# Patient Record
Sex: FEMALE | Race: WHITE
[De-identification: names, ages, dates, MRNs, and addresses within clinical notes are randomized per-mention and may not be internally consistent; named-entity substitution may affect disease eponyms.]

---

## 2017-01-02 ENCOUNTER — HOSPITAL ENCOUNTER (EMERGENCY)
Dept: HOSPITAL 74 - JER | Age: 57
LOS: 1 days | Discharge: HOME | End: 2017-01-03
Payer: COMMERCIAL

## 2017-01-02 VITALS — TEMPERATURE: 98.5 F

## 2017-01-02 VITALS — BODY MASS INDEX: 24.4 KG/M2

## 2017-01-02 DIAGNOSIS — G51.0: Primary | ICD-10-CM

## 2017-01-02 LAB
ALBUMIN SERPL-MCNC: 4.3 G/DL (ref 3.4–5)
ALP SERPL-CCNC: 101 U/L (ref 45–117)
ALT SERPL-CCNC: 32 U/L (ref 12–78)
ANION GAP SERPL CALC-SCNC: 5 MMOL/L (ref 8–16)
APPEARANCE UR: CLEAR
AST SERPL-CCNC: 19 U/L (ref 15–37)
BACTERIA #/AREA URNS HPF: (no result) /HPF
BASOPHILS # BLD: 1 % (ref 0–2)
BILIRUB SERPL-MCNC: 0.3 MG/DL (ref 0.2–1)
BILIRUB UR STRIP.AUTO-MCNC: NEGATIVE MG/DL
CALCIUM SERPL-MCNC: 9.2 MG/DL (ref 8.5–10.1)
CHOLEST SERPL-MCNC: 267 MG/DL (ref 50–200)
CO2 SERPL-SCNC: 30 MMOL/L (ref 21–32)
COLOR UR: COLORLESS
CREAT SERPL-MCNC: 0.6 MG/DL (ref 0.55–1.02)
DEPRECATED RDW RBC AUTO: 12.5 % (ref 11.6–15.6)
EOSINOPHIL # BLD: 1.9 % (ref 0–4.5)
GLUCOSE SERPL-MCNC: 105 MG/DL (ref 74–106)
INR BLD: 0.95 (ref 0.82–1.09)
KETONES UR QL STRIP: NEGATIVE
LDLC SERPL CALC-MCNC: 176 MG/DL (ref 5–100)
LEUKOCYTE ESTERASE UR QL STRIP.AUTO: (no result)
MCH RBC QN AUTO: 31.4 PG (ref 25.7–33.7)
MCHC RBC AUTO-ENTMCNC: 33.7 G/DL (ref 32–36)
MCV RBC: 93.1 FL (ref 80–96)
NEUTROPHILS # BLD: 61.7 % (ref 42.8–82.8)
NITRITE UR QL STRIP: NEGATIVE
PH UR: 6 [PH] (ref 5–8)
PLATELET # BLD AUTO: 330 K/MM3 (ref 134–434)
PMV BLD: 7.2 FL (ref 7.5–11.1)
PROT SERPL-MCNC: 7.6 G/DL (ref 6.4–8.2)
PROT UR QL STRIP: NEGATIVE
PROT UR QL STRIP: NEGATIVE
PT PNL PPP: 10.4 SEC (ref 9.98–11.88)
RBC # BLD AUTO: <1 /HPF (ref 0–3)
RBC # UR STRIP: NEGATIVE /UL
SP GR UR: 1 (ref 1–1.03)
TROPONIN I SERPL-MCNC: < 0.02 NG/ML (ref 0–0.05)
UROBILINOGEN UR STRIP-MCNC: NEGATIVE E.U./DL (ref 0.2–1)
WBC # BLD AUTO: 5.9 K/MM3 (ref 4–10)
WBC # UR AUTO: 1 /HPF (ref 3–5)

## 2017-01-02 PROCEDURE — 3E0337Z INTRODUCTION OF ELECTROLYTIC AND WATER BALANCE SUBSTANCE INTO PERIPHERAL VEIN, PERCUTANEOUS APPROACH: ICD-10-PCS

## 2017-01-02 PROCEDURE — 3E033NZ INTRODUCTION OF ANALGESICS, HYPNOTICS, SEDATIVES INTO PERIPHERAL VEIN, PERCUTANEOUS APPROACH: ICD-10-PCS

## 2017-01-02 NOTE — PDOC
History of Present Illness





- History of Present Illness


Initial Comments: 


01/02/17 23:12


Patient is a 56 year old female with significant medical hx of nerve palsy, 

arthritis, and hyperlipidemia who is presenting to the ED with one week of 

blurry vision and one day of slurred speech. The patient reports she had blurry 

vision for over a week but she figured it was due to her history of nerve 

palsy. Today she woke up with difficulty speaking that was waxing and waning. 

She also complains of dizziness and nausea. 








<Kianna Tobin - Last Filed: 01/02/17 23:12>





<Neli Salvador - Last Filed: 01/02/17 23:50>





- General


Chief Complaint: CVA/TIA


Stated Complaint: BLURRY VISION/DIFF BREATHING


Time Seen by Provider: 01/02/17 19:16





Past History





<Kianna Tobin - Last Filed: 01/02/17 23:12>





- Past Medical History


Anemia: No


Asthma: No


Cancer: No


Cardiac Disorders: No


CVA: No


COPD: No


CHF: No


Dementia: No


Diabetes: No


GI Disorders: Yes (H/O COLON POLYPS)


 Disorders: No


HTN: No


Hypercholesterolemia: Yes (NO MEDICATIONS)


Liver Disease: No


Seizures: No


Thyroid Disease: No


Other medical history: ARTHRITIS





- Surgical History


Abdominal Surgery: No


Appendectomy: Yes


Cardiac Surgery: No


Cholecystectomy: No


Lung Surgery: No


Neurologic Surgery: No


Orthopedic Surgery: No





- Psycho/Social/Smoking Cessation Hx


Suicidal Ideation: No


Smoking History: Never smoked


Have you smoked in the past 12 months: No


If you are a former smoker, when did you quit?: 2010


Information on smoking cessation initiated: No


Hx Alcohol Use: Yes (SOCIALLY)


Drug/Substance Use Hx: No


Substance Use Type: Alcohol


Hx Substance Use Treatment: No





<Neli Salvador - Last Filed: 01/02/17 23:50>





- Past Medical History


Allergies/Adverse Reactions: 


 Allergies











Allergy/AdvReac Type Severity Reaction Status Date / Time


 


No Known Allergies Allergy   Verified 01/02/17 18:58











Home Medications: 


Ambulatory Orders





NK [No Known Home Medication]  01/02/17 











**Review of Systems





- Review of Systems


Comments:: 


01/02/17 23:13


CONSTITUTIONAL:


Absent: fever, chills, diaphoresis, generalized weakness, malaise, loss of 

appetite


HEENT:


Present: diplopia 


Absent: rhinorrhea, nasal congestion, throat pain, throat swelling, difficulty 

swallowing, mouth swelling, ear pain, eye pain


CARDIOVASCULAR: 


Absent: chest pain, syncope, palpitations, irregular heart rate, lightheadedness

, peripheral edema


RESPIRATORY: 


Absent: cough, shortness of breath, dyspnea with exertion, orthopnea, wheezing, 

stridor, hemoptysis


GASTROINTESTINAL:


Present: nausea


Absent: abdominal pain, abdominal distension, vomiting, diarrhea, constipation, 

melena, hematochezia


GENITOURINARY: 


Absent: dysuria, frequency, urgency, hesitancy, hematuria, flank pain, genital 

pain


MUSCULOSKELETAL: 


Absent: myalgia, arthralgia, joint swelling


SKIN: 


Absent: rash, itching, pallor


HEMATOLOGIC/IMMUNOLOGIC: 


Absent: easy bleeding, easy bruising, lymphadenopathy, frequent infections


ENDOCRINE:


Absent: unexplained weight gain, unexplained weight loss, heat intolerance, 

cold intolerance


NEUROLOGIC: 


Present: slurred speech, dizziness


Absent: headache, focal weakness or paresthesia, unsteady gait, seizure, mental 

status changes, bladder or bowel incontinence.


PSYCHIATRIC: 


Absent: anxiety, depression, suicidal or homicidal ideation, hallucinations








<Kianna Tobin - Last Filed: 01/02/17 23:12>





*Physical Exam





- Vital Signs


 Last Vital Signs











Temp Pulse Resp BP Pulse Ox


 


 98.5 F   88   18   182/95   100 


 


 01/02/17 18:53  01/02/17 18:53  01/02/17 18:53  01/02/17 18:53  01/02/17 19:20














- Physical Exam


Comments: 


01/02/17 23:15


GENERAL:


Well developed, well nourished. Awake and alert. No acute distress.


HEENT:


Normocephalic, atraumatic. PERRLA. Extraoccular eye movements show superior 

oblique palsy. No conjunctival pallor. Sclera are non-icteric. Moist mucous 

membranes. Oropharynx is clear.


NECK: 


Supple. Full ROM. No JVD. Carotid pulses 2+ and symmetric, without bruits. No 

thyromegaly. No 


lymphadenopathy.


CARDIOVASCULAR:


Regular rate and rhythm. No murmurs, rubs, or gallops. Distal pulses are 2+ and 

symmetric. 


PULMONARY: 


No evidence of respiratory distress. Lungs clear to auscultation bilaterally. 

No wheezing, rales or rhonchi.


ABDOMINAL:


Soft. Non-tender. Non-distended. No rebound or guarding. No organomegaly. 

Normoactive bowel sounds. 


MUSCULOSKELETAL: 


Normal range of motion at all joints. No bony deformities or tenderness. No CVA 

tenderness.


EXTREMITIES: 


No cyanosis. No clubbing. No edema. No calf tenderness.


SKIN: 


Warm and dry. Normal capillary refill. No rashes. No jaundice. 


NEUROLOGICAL: 


Alert, awake, appropriate. Cranial nerves 2-12 intact. No gross focal neural 

deficits. Mild facial numbness on the left. Normal speech. Gait is normal 

without ataxia.


PSYCHIATRIC: 


Cooperative. Good eye contact. Appropriate mood and affect.








<Kianna Tobin - Last Filed: 01/02/17 23:12>





- Vital Signs


 Last Vital Signs











Temp Pulse Resp BP Pulse Ox


 


 98.5 F   88   18   182/95   98 


 


 01/02/17 18:53  01/02/17 18:53  01/02/17 18:53  01/02/17 18:53  01/02/17 18:53














<Neli Salvador - Last Filed: 01/02/17 23:50>





**Discharge Disposition





<Kianna Tobin - Last Filed: 01/02/17 23:12>





<Neli Salvador - Last Filed: 01/02/17 23:50>





- Diagnosis


 Gaze palsy, Bell's palsy





- Discharge Dispostion


Disposition: HOME


Condition at time of disposition: Stable





- Referrals


Referrals: 


Jane Alonzo MD [Primary Care Provider] - 





- Patient Instructions


Printed Discharge Instructions:  DI for Bell's Palsy, DI for Visual Field 

Disturbances


Additional Instructions: 


Tomorrow see Dr Jane Alonzo 


-she is going to make arrangement for further neurological testing and also you 

need to see your ophthalmologist again for your gaze palsy





ED Treatment Course





- LABORATORY


CBC & Chemistry Diagram: 


 01/02/17 19:30





 01/02/17 19:30





- ADDITIONAL ORDERS


Additional order review: 


 Laboratory  Results











  01/02/17 01/02/17





  19:30 19:30


 


INR   0.95


 


Urine Color  Colorless 


 


Urine Appearance  Clear 


 


Urine pH  6.0 


 


Ur Specific Gravity  1.002 


 


Urine Protein  Negative 


 


Urine Glucose (UA)  Negative 


 


Urine Ketones  Negative 


 


Urine Blood  Negative 


 


Urine Nitrite  Negative 


 


Urine Bilirubin  Negative 


 


Urine Urobilinogen  Negative 


 


Ur Leukocyte Esterase  Trace H 


 


Urine RBC  <1 


 


Urine WBC  1 


 


Ur Epithelial Cells  Rare 


 


Urine Bacteria  Few 








 











  01/02/17





  19:30


 


RBC  4.63


 


MCV  93.1


 


MCHC  33.7


 


RDW  12.5


 


MPV  7.2 L


 


Neutrophils %  61.7


 


Lymphocytes %  28.2


 


Monocytes %  7.2


 


Eosinophils %  1.9


 


Basophils %  1.0














- RADIOLOGY


Radiograph Interpretation: 





01/02/17 20:15











: ([email or web address])


================= Begin of Report Content =================





Referring Physician: Neli Salvador


Alia Marroquin


Spoke with Dr. Salvador at 1946 hrs


THIS DOCUMENT HAS BEEN ELECTRONICALLY SIGNED


Kevin Cason MD


01/02/2017 19:47 EST


M.D. Please call [facility] [phone] ([phone]) with questions.








PREVIOUS REPORT:


Referring Physician: Neli Salvador


Patient Name: Alia Marroquin


THIS IS A PRELIMINARY REPORT FROM [facility]





DATE OF SERVICE: 2015-05-21 13:36:06.0





IMAGES: [id number]





EXAM: CT of the brain without contrast





HISTORY:Possible cerebrovascular accident





COMPARISON: None.





FINDINGS:Serial transaxial images of the brain are available without 

intravenous 


contrast agent.





The brain parenchyma is normal. The ventricular system is within normal limits.





Mastoid air cells are well-aerated. The calvarium appears intact. Visible 


portions of the paranasal sinuses are well-aerated.





IMPRESSION: Negative study


THIS DOCUMENT HAS BEEN ELECTRONICALLY SIGNED


Kevin Cason MD


01/02/2017 19:40 EST


M.D. Please call [facility] [phone]) with questions.





================= End of Report Content =================


 














- Medications


Given in the ED: 


ED Medications














Discontinued Medications














Generic Name Dose Route Start Last Admin





  Trade Name Freq  PRN Reason Stop Dose Admin


 


Acetaminophen  1,000 mg 01/02/17 19:50 01/02/17 19:54





  Ofirmev Injection -  IVPB 01/02/17 19:51  1,000 mg





  ONCE ONE   Administration














<Kianna Tobin - Last Filed: 01/02/17 23:12>





- LABORATORY


CBC & Chemistry Diagram: 


 01/02/17 19:30





 01/02/17 19:30





<Neli Salvador - Last Filed: 01/02/17 23:50>





Attestations





- Attestations


01/02/17 23:18


Documentation prepared by Kianna Tobin, acting as medical scribe for Neli Salvador MD.





<Kianna Tobin - Last Filed: 01/02/17 23:12>

## 2017-01-03 VITALS — HEART RATE: 77 BPM | SYSTOLIC BLOOD PRESSURE: 156 MMHG | DIASTOLIC BLOOD PRESSURE: 87 MMHG

## 2017-01-03 NOTE — EKG
Test Reason : 

Blood Pressure : ***/*** mmHG

Vent. Rate : 072 BPM     Atrial Rate : 072 BPM

   P-R Int : 140 ms          QRS Dur : 082 ms

    QT Int : 390 ms       P-R-T Axes : 033 013 032 degrees

   QTc Int : 427 ms

 

NORMAL SINUS RHYTHM

NORMAL ECG

NO PREVIOUS ECGS AVAILABLE

Confirmed by NILE MORENO MD ([id number]) on 1/3/2017 9:52:08 AM

 

Referred By:             Overread By: NILE MORENO MD

## 2019-11-08 ENCOUNTER — HOSPITAL ENCOUNTER (OUTPATIENT)
Dept: HOSPITAL 74 - FASU | Age: 59
Discharge: HOME | End: 2019-11-08
Attending: ORTHOPAEDIC SURGERY
Payer: COMMERCIAL

## 2019-11-08 VITALS — DIASTOLIC BLOOD PRESSURE: 76 MMHG | TEMPERATURE: 98 F | HEART RATE: 75 BPM | SYSTOLIC BLOOD PRESSURE: 132 MMHG

## 2019-11-08 VITALS — BODY MASS INDEX: 27.1 KG/M2

## 2019-11-08 DIAGNOSIS — S83.242A: Primary | ICD-10-CM

## 2019-11-08 DIAGNOSIS — S83.282A: ICD-10-CM
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
## 2019-11-08 DIAGNOSIS — M65.862: ICD-10-CM

## 2019-11-08 DIAGNOSIS — S83.8X2A: ICD-10-CM

## 2019-11-08 PROCEDURE — 0SBD4ZZ EXCISION OF LEFT KNEE JOINT, PERCUTANEOUS ENDOSCOPIC APPROACH: ICD-10-PCS | Performed by: ORTHOPAEDIC SURGERY

## 2019-11-08 NOTE — OP
DATE OF OPERATION:  11/08/2019

 

Done at [facility] 

 

SURGEON:  Ish Ballesteros MD 

 

ASSISTANT:  DIANA Borjas 

 

PREOPERATIVE DIAGNOSES:

1.  Left knee medial and lateral meniscal tear.  

2.  Left knee cartilage injury.

3.  Left knee synovitis.  

 

POSTOPERATIVE DIAGNOSES: 

1.  Left knee medial and lateral meniscal tear.  

2.  Left knee cartilage injury.

3.  Left knee synovitis.  

 

PROCEDURE:

1.  Left knee arthroscopy with partial meniscectomy, medial and lateral meniscus, CPT

code 03992.

2.  Left knee arthroscopy with chondroplasty and abrasion-plasty, CPT code 85095.

3.  Left knee arthroscopy with synovectomy, CPT code 34862. 

 

FINDINGS:

1.  Medial meniscus body-to-posterior horn tear.

2.  Lateral meniscus central body-to-posterior horn tear. 

3.  Synovitis patellofemoral medial and lateral notch area.

4.  Central grade 3-4 cartilage injury central medial femoral condyle with cartilage

flap 6 cm x 4 cm. 

5.  ACL and PCL intact.

6.  Diffuse grade 1-2 cartilage injury lateral joint line.

7.  Central grade 2-3 cartilage injury patella with 3-4 changes patellofemoral

trochlea central 1/3. 

 

PROCEDURE:  Informed consent was obtained.  The patient came to the operating room,

where the lower extremity was prepped and draped in a sterile fashion.  A tourniquet

was placed on the upper thigh, but not inflated. 

 

Using standard arthroscopic technique, a lateral incision and portal was made to

allow for introduction of the camera into the suprapatellar bursa.  This was then

taken to the medial joint line, where under direct visualization, a medial incision

and portal was made.

 

Excessive synovium noted in the medial, lateral and patellofemoral and notch area was

removed by an upbiter, shaver and Bovie cautery.  This was found to bring in

inflammatory tissue into the joint surface, a source of pain and dysfunction. 

 

Probing of the medial and lateral meniscus found tears, as described in the findings.

 These were removed with the upbiter and shaver and taken back to a stable rim. 

 

Grade 2 to 3 degenerative changes were treated with a chondroplasty, removing all

flaking surfaces with low-setting Bovie along the periphery to prevent further

flaking.  

 

Grade 4 changes, as noted, were treated with an abrasoplasty, creating a bleeding

surface at the bone/cartilage interface.  Aggressive debridement with shaver/luis

created bleeding surface.  Micro fracture also done when indicated in findings.

 

All areas of the knee were once again reexamined.  The knee was then drained and a

single suture was placed in all portals.  A sterile dressing was placed and the

patient was transferred to the recovery room without complication. 

 

The PA listed above was present and assisted at surgery.  Their presence was

absolutely medically necessary for the completion of the procedure.  They helped hold

the arthroscopy, pass instruments (and implants when indicated) and the procedure

could not have been completed without their assistance.

 

 

ISH BALLESTEROS M.D.

 

[id number]

DD: 11/08/2019 11:40

DT: 11/08/2019 13:51

Job #:  [id number]

## 2019-11-13 NOTE — PATH
Surgical Pathology Report



Patient Name:  CHUCKY BAUTISTA

Accession #:  [id number]

Med. Rec. #:  [id number]                                                        

   /Age/Gender:  1960 (Age: 59) / F

Account:  [id number]                                                          

             Location: [facility] AMBULATORY 

Taken:  2019

Received:  2019

Reported:  2019

Physicians:  Ish Carson M.D.

  



Specimen(s) Received

 LEFT KNEE SHAVINGS 





Clinical History

Internal derangement of the left knee







Final Diagnosis

Knee, left, arthroscopic shavings:

Fibrosynovial tissue and cartilage.





***Electronically Signed***

May Santana M.D.





Gross Description

Received in formalin, labeled "left knee shavings," is a 3.5 x 3.0 x 0.3 cm.

aggregate of tan-yellow soft tissue fragments. A representative portion is

submitted in one cassette.

/2019



[place]

## 2024-01-15 ENCOUNTER — OFFICE (OUTPATIENT)
Dept: URBAN - METROPOLITAN AREA CLINIC 121 | Facility: CLINIC | Age: 64
Setting detail: OPHTHALMOLOGY
End: 2024-01-15
Payer: COMMERCIAL

## 2024-01-15 DIAGNOSIS — G70.00: ICD-10-CM

## 2024-01-15 DIAGNOSIS — H25.13: ICD-10-CM

## 2024-01-15 DIAGNOSIS — H16.223: ICD-10-CM

## 2024-01-15 DIAGNOSIS — H40.023: ICD-10-CM

## 2024-01-15 PROCEDURE — 76514 ECHO EXAM OF EYE THICKNESS: CPT | Performed by: OPHTHALMOLOGY

## 2024-01-15 PROCEDURE — 92250 FUNDUS PHOTOGRAPHY W/I&R: CPT | Performed by: OPHTHALMOLOGY

## 2024-01-15 PROCEDURE — 92004 COMPRE OPH EXAM NEW PT 1/>: CPT | Performed by: OPHTHALMOLOGY

## 2024-01-15 ASSESSMENT — SUPERFICIAL PUNCTATE KERATITIS (SPK)
OD_SPK: T
OS_SPK: T

## 2024-01-15 ASSESSMENT — CONFRONTATIONAL VISUAL FIELD TEST (CVF)
OD_FINDINGS: FULL
OS_FINDINGS: FULL

## 2024-01-16 PROBLEM — G70.00 MYASTHENIA GRAVIS WITHOUT ACUTE EXACERBATION ; BOTH EYES: Status: ACTIVE | Noted: 2024-01-15

## 2024-01-16 ASSESSMENT — REFRACTION_AUTOREFRACTION
OS_AXIS: 180
OS_SPHERE: +2.75
OS_CYLINDER: +0.75
OD_AXIS: 168
OD_SPHERE: +3.00
OD_CYLINDER: +0.50

## 2024-01-16 ASSESSMENT — REFRACTION_CURRENTRX
OS_CYLINDER: +1.00
OS_AXIS: 001
OD_AXIS: 163
OD_ADD: +2.25
OS_OVR_VA: 20/
OD_SPHERE: +2.75
OD_OVR_VA: 20/
OS_SPHERE: +2.75
OD_CYLINDER: +0.75
OS_ADD: +2.25

## 2024-01-16 ASSESSMENT — SPHEQUIV_DERIVED
OS_SPHEQUIV: 3.125
OD_SPHEQUIV: 3.25

## 2024-03-18 ENCOUNTER — OFFICE (OUTPATIENT)
Dept: URBAN - METROPOLITAN AREA CLINIC 121 | Facility: CLINIC | Age: 64
Setting detail: OPHTHALMOLOGY
End: 2024-03-18
Payer: COMMERCIAL

## 2024-03-18 DIAGNOSIS — H40.023: ICD-10-CM

## 2024-03-18 PROCEDURE — 92133 CPTRZD OPH DX IMG PST SGM ON: CPT | Performed by: OPHTHALMOLOGY

## 2024-03-18 PROCEDURE — 92083 EXTENDED VISUAL FIELD XM: CPT | Performed by: OPHTHALMOLOGY

## 2024-03-18 PROCEDURE — 99212 OFFICE O/P EST SF 10 MIN: CPT | Performed by: OPHTHALMOLOGY

## 2024-03-18 ASSESSMENT — REFRACTION_MANIFEST
OD_AXIS: 165
OD_ADD: +2.25
OS_SPHERE: +2.75
OS_AXIS: 180
OS_ADD: +2.25
OD_CYLINDER: +0.75
OS_CYLINDER: +1.00
OD_SPHERE: +2.75

## 2024-03-18 ASSESSMENT — REFRACTION_CURRENTRX
OS_ADD: +2.25
OD_ADD: +2.25
OS_OVR_VA: 20/
OS_SPHERE: +2.75
OS_AXIS: 001
OD_SPHERE: +2.75
OD_AXIS: 163
OS_CYLINDER: +1.00
OD_CYLINDER: +0.75
OD_OVR_VA: 20/

## 2024-03-18 ASSESSMENT — SPHEQUIV_DERIVED
OD_SPHEQUIV: 3.125
OS_SPHEQUIV: 3.25

## 2024-09-23 ENCOUNTER — OFFICE (OUTPATIENT)
Dept: URBAN - METROPOLITAN AREA CLINIC 121 | Facility: CLINIC | Age: 64
Setting detail: OPHTHALMOLOGY
End: 2024-09-23
Payer: COMMERCIAL

## 2024-09-23 DIAGNOSIS — H25.13: ICD-10-CM

## 2024-09-23 DIAGNOSIS — H16.223: ICD-10-CM

## 2024-09-23 DIAGNOSIS — H40.023: ICD-10-CM

## 2024-09-23 DIAGNOSIS — G70.00: ICD-10-CM

## 2024-09-23 PROCEDURE — 99213 OFFICE O/P EST LOW 20 MIN: CPT | Performed by: OPHTHALMOLOGY

## 2024-09-23 ASSESSMENT — CONFRONTATIONAL VISUAL FIELD TEST (CVF)
OD_FINDINGS: FULL
OS_FINDINGS: FULL

## 2025-03-26 ENCOUNTER — RX ONLY (RX ONLY)
Age: 65
End: 2025-03-26

## 2025-03-26 ENCOUNTER — OFFICE (OUTPATIENT)
Facility: LOCATION | Age: 65
Setting detail: OPHTHALMOLOGY
End: 2025-03-26
Payer: COMMERCIAL

## 2025-03-26 DIAGNOSIS — G70.00: ICD-10-CM

## 2025-03-26 DIAGNOSIS — H16.223: ICD-10-CM

## 2025-03-26 DIAGNOSIS — H00.14: ICD-10-CM

## 2025-03-26 DIAGNOSIS — H25.13: ICD-10-CM

## 2025-03-26 DIAGNOSIS — H40.023: ICD-10-CM

## 2025-03-26 PROCEDURE — 92083 EXTENDED VISUAL FIELD XM: CPT | Performed by: OPHTHALMOLOGY

## 2025-03-26 PROCEDURE — 92012 INTRM OPH EXAM EST PATIENT: CPT | Performed by: OPHTHALMOLOGY

## 2025-03-26 PROCEDURE — 92133 CPTRZD OPH DX IMG PST SGM ON: CPT | Performed by: OPHTHALMOLOGY

## 2025-03-26 ASSESSMENT — VISUAL ACUITY
OD_BCVA: 20/25
OS_BCVA: 20/25

## 2025-03-26 ASSESSMENT — REFRACTION_CURRENTRX
OD_OVR_VA: 20/
OS_SPHERE: +2.75
OD_SPHERE: +2.75
OS_CYLINDER: +1.00
OS_AXIS: 001
OD_AXIS: 163
OS_OVR_VA: 20/
OS_ADD: +2.25
OD_CYLINDER: +0.75
OD_ADD: +2.25

## 2025-03-26 ASSESSMENT — REFRACTION_AUTOREFRACTION
OS_AXIS: 180
OD_AXIS: 168
OS_SPHERE: +2.75
OD_CYLINDER: +0.50
OD_SPHERE: +3.00
OS_CYLINDER: +0.75

## 2025-03-26 ASSESSMENT — REFRACTION_MANIFEST
OS_AXIS: 180
OD_AXIS: 165
OS_SPHERE: +2.75
OS_CYLINDER: +1.00
OD_CYLINDER: +0.75
OD_ADD: +2.25
OD_SPHERE: +2.75
OS_ADD: +2.25

## 2025-03-26 ASSESSMENT — TONOMETRY
OS_IOP_MMHG: 15
OD_IOP_MMHG: 15

## 2025-03-26 ASSESSMENT — SUPERFICIAL PUNCTATE KERATITIS (SPK)
OS_SPK: T
OD_SPK: T

## 2025-03-26 ASSESSMENT — CONFRONTATIONAL VISUAL FIELD TEST (CVF)
OD_FINDINGS: FULL
OS_FINDINGS: FULL

## 2025-03-26 ASSESSMENT — KERATOMETRY
OS_AXISANGLE_DEGREES: 180
OS_K2POWER_DIOPTERS: 43.75
OD_K2POWER_DIOPTERS: 43.75
OD_K1POWER_DIOPTERS: 43.25
OS_K1POWER_DIOPTERS: 43.00
OD_AXISANGLE_DEGREES: 146
METHOD_AUTO_MANUAL: AUTO

## 2025-03-26 ASSESSMENT — PACHYMETRY
OS_CT_CORRECTION: -1
OS_CT_UM: 559
OD_CT_CORRECTION: -1
OD_CT_UM: 550